# Patient Record
Sex: FEMALE | URBAN - NONMETROPOLITAN AREA
[De-identification: names, ages, dates, MRNs, and addresses within clinical notes are randomized per-mention and may not be internally consistent; named-entity substitution may affect disease eponyms.]

---

## 2018-08-30 ENCOUNTER — OFFICE VISIT (OUTPATIENT)
Dept: URBAN - NONMETROPOLITAN AREA CLINIC 9 | Facility: CLINIC | Age: 68
End: 2018-08-30
Payer: COMMERCIAL

## 2018-08-30 DIAGNOSIS — H26.493 OTHER SECONDARY CATARACT, BILATERAL: Primary | ICD-10-CM

## 2018-08-30 PROCEDURE — 92014 COMPRE OPH EXAM EST PT 1/>: CPT | Performed by: OPTOMETRIST

## 2018-08-30 RX ORDER — METOPROLOL TARTRATE 75 MG/1
75 MG TABLET, FILM COATED ORAL
Qty: 0 | Refills: 0 | Status: ACTIVE
Start: 2018-08-30

## 2018-08-30 RX ORDER — HYDROCHLOROTHIAZIDE 25 MG/1
25 MG TABLET ORAL
Qty: 0 | Refills: 0 | Status: INACTIVE
Start: 2018-08-30 | End: 2020-08-31

## 2018-08-30 ASSESSMENT — INTRAOCULAR PRESSURE
OD: 16
OS: 17

## 2018-08-30 NOTE — IMPRESSION/PLAN
DATE: 05/06/2017



The patient seen and examined.  Interim events noted.  Consults noted and appreciated.  Urology follo
wup and intervention noted and appreciated.  The patient remains in progressive care unit on telemetr
y monitoring.  Denies any specific complaint.  No chest pain, no shortness of breath.  No headache, n
o dizziness.



PHYSICAL EXAMINATION:

GENERAL:  The patient is in no acute distress.

VITAL SIGNS:  Stable.

HEART:  S1, S2 normal, regular.

LUNGS:  Good bilateral air entry.

ABDOMEN:  Soft, nontender.

EXTREMITIES:  No edema, no calf swelling, no tenderness, no acute ischemia.

CENTRAL NERVOUS SYSTEM:  Essentially unchanged.



DIAGNOSTIC DATA:  Available diagnostic data reviewed.



Overall, patient's general medical condition is ____.  Physical therapy evaluation is pending.  



PLAN:  As ordered.





__________________________________________

Alvino Hargrove MD







cc:



DD: 05/06/2017 07:07:34  [id number]

TT: 05/06/2017 07:55:23

Confirmation # [id number]

Dictation # [id number]

tn Impression: Other secondary cataract, bilateral: H26.493.  Plan: Observe

## 2018-08-30 NOTE — IMPRESSION/PLAN
Impression: Type 2 diabetes mellitus w/o complication: G06.8. Plan: Diabetes type II: no background retinopathy, no signs of neovascularization noted. Discussed ocular and systemic benefits of blood sugar control.

## 2019-09-06 ENCOUNTER — OFFICE VISIT (OUTPATIENT)
Dept: URBAN - NONMETROPOLITAN AREA CLINIC 9 | Facility: CLINIC | Age: 69
End: 2019-09-06
Payer: COMMERCIAL

## 2019-09-06 DIAGNOSIS — H04.123 DRY EYE SYNDROME OF BILATERAL LACRIMAL GLANDS: Chronic | ICD-10-CM

## 2019-09-06 PROCEDURE — 92014 COMPRE OPH EXAM EST PT 1/>: CPT | Performed by: OPTOMETRIST

## 2019-09-06 ASSESSMENT — VISUAL ACUITY: OS: 20/30

## 2019-09-06 ASSESSMENT — INTRAOCULAR PRESSURE
OS: 13
OD: 13

## 2020-08-31 ENCOUNTER — OFFICE VISIT (OUTPATIENT)
Dept: URBAN - NONMETROPOLITAN AREA CLINIC 9 | Facility: CLINIC | Age: 70
End: 2020-08-31
Payer: COMMERCIAL

## 2020-08-31 DIAGNOSIS — E11.9 TYPE 2 DIABETES MELLITUS W/O COMPLICATION: Primary | Chronic | ICD-10-CM

## 2020-08-31 DIAGNOSIS — H43.813 VITREOUS DEGENERATION, BILATERAL: Chronic | ICD-10-CM

## 2020-08-31 DIAGNOSIS — H26.492 OTHER SECONDARY CATARACT, LEFT EYE: Chronic | ICD-10-CM

## 2020-08-31 PROCEDURE — 92014 COMPRE OPH EXAM EST PT 1/>: CPT | Performed by: OPTOMETRIST

## 2020-08-31 ASSESSMENT — VISUAL ACUITY
OD: 20/25
OS: 20/150

## 2020-08-31 ASSESSMENT — INTRAOCULAR PRESSURE
OD: 16
OS: 16

## 2020-08-31 NOTE — IMPRESSION/PLAN
Impression: Type 2 diabetes mellitus w/o complication: H71.8. Bilateral. Plan: Diabetes type II: no background diabetic retinopathy, no signs of neovascularization noted. Discussed ocular and systemic benefits of blood sugar control. Continue to monitor for changes. Advised patient to RTC immediately if changes to vision are noted.

## 2020-08-31 NOTE — IMPRESSION/PLAN
Impression: Other secondary cataract, left eye: H26.492. Plan: Discussed diagnosis and treatment with patient. Patient referred to Dr. Yane Christensen for YAG consult.

## 2020-10-19 ENCOUNTER — OFFICE VISIT (OUTPATIENT)
Dept: URBAN - NONMETROPOLITAN AREA CLINIC 9 | Facility: CLINIC | Age: 70
End: 2020-10-19
Payer: COMMERCIAL

## 2020-10-19 DIAGNOSIS — Z96.1 PRESENCE OF INTRAOCULAR LENS: ICD-10-CM

## 2020-10-19 PROCEDURE — 99204 OFFICE O/P NEW MOD 45 MIN: CPT | Performed by: OPHTHALMOLOGY

## 2020-10-19 ASSESSMENT — KERATOMETRY
OD: 45.25
OS: 45.75

## 2020-10-19 ASSESSMENT — VISUAL ACUITY
OS: 20/150
OD: 20/25

## 2020-10-19 ASSESSMENT — INTRAOCULAR PRESSURE
OS: 15
OD: 15

## 2020-10-19 NOTE — IMPRESSION/PLAN
Impression: Type 2 diabetes mellitus w/o complication: O08.2. Plan: Diabetes type II: no background retinopathy, no signs of neovascularization noted. Discussed ocular and systemic benefits of blood sugar control.

## 2020-11-11 ENCOUNTER — SURGERY (OUTPATIENT)
Dept: URBAN - METROPOLITAN AREA SURGERY 40 | Facility: SURGERY | Age: 70
End: 2020-11-11
Payer: COMMERCIAL

## 2020-11-11 PROCEDURE — 66821 AFTER CATARACT LASER SURGERY: CPT | Performed by: OPHTHALMOLOGY

## 2020-11-16 ENCOUNTER — Encounter (OUTPATIENT)
Dept: URBAN - METROPOLITAN AREA SURGERY 39 | Facility: SURGERY | Age: 70
End: 2020-11-16
Payer: COMMERCIAL

## 2020-11-18 ENCOUNTER — POST-OPERATIVE VISIT (OUTPATIENT)
Dept: URBAN - NONMETROPOLITAN AREA CLINIC 9 | Facility: CLINIC | Age: 70
End: 2020-11-18
Payer: COMMERCIAL

## 2020-11-18 DIAGNOSIS — Z48.810 ENCOUNTER FOR SURGICAL AFTERCARE FOLLOWING SURGERY ON A SENSE ORGAN: Primary | ICD-10-CM

## 2020-11-18 PROCEDURE — 99024 POSTOP FOLLOW-UP VISIT: CPT | Performed by: OPTOMETRIST

## 2020-11-18 ASSESSMENT — INTRAOCULAR PRESSURE: OS: 16

## 2020-11-18 NOTE — IMPRESSION/PLAN
Impression: S/P YAG PC OS - 7 Days. Encounter for surgical aftercare following surgery on a sense organ  Z48.810. Plan: Stable. Patient not using drops.

## 2021-08-27 ENCOUNTER — OFFICE VISIT (OUTPATIENT)
Dept: URBAN - NONMETROPOLITAN AREA CLINIC 8 | Facility: CLINIC | Age: 71
End: 2021-08-27
Payer: COMMERCIAL

## 2021-08-27 PROCEDURE — 92014 COMPRE OPH EXAM EST PT 1/>: CPT | Performed by: OPTOMETRIST

## 2021-08-27 ASSESSMENT — INTRAOCULAR PRESSURE
OS: 17
OD: 17

## 2021-08-27 NOTE — IMPRESSION/PLAN
Impression: Type 2 diabetes mellitus w/o complication: S08.0. Plan: No diabetic retinopathy today. Discussed importance of closely monitoring and controlling glucose to minimize risks of progression of disease. Patient instructed to notify clinic immediately if changes to vision are noted.

## 2023-08-22 ENCOUNTER — OFFICE VISIT (OUTPATIENT)
Dept: URBAN - METROPOLITAN AREA CLINIC 63 | Facility: CLINIC | Age: 73
End: 2023-08-22
Payer: COMMERCIAL

## 2023-08-22 DIAGNOSIS — E11.9 TYPE 2 DIABETES MELLITUS W/O COMPLICATION: Primary | ICD-10-CM

## 2023-08-22 DIAGNOSIS — H02.055 TRICHIASIS WITHOUT ENTROPION LEFT LOWER EYELID: ICD-10-CM

## 2023-08-22 DIAGNOSIS — H04.123 DRY EYE SYNDROME OF BILATERAL LACRIMAL GLANDS: ICD-10-CM

## 2023-08-22 DIAGNOSIS — H43.813 VITREOUS DEGENERATION, BILATERAL: ICD-10-CM

## 2023-08-22 DIAGNOSIS — Z96.1 PRESENCE OF INTRAOCULAR LENS: ICD-10-CM

## 2023-08-22 PROCEDURE — 67820 REVISE EYELASHES: CPT | Performed by: OPTOMETRIST

## 2023-08-22 PROCEDURE — 92014 COMPRE OPH EXAM EST PT 1/>: CPT | Performed by: OPTOMETRIST

## 2023-08-22 ASSESSMENT — KERATOMETRY
OS: 45.00
OD: 44.75

## 2023-08-22 ASSESSMENT — INTRAOCULAR PRESSURE
OD: 14
OS: 14

## 2024-09-17 ENCOUNTER — OFFICE VISIT (OUTPATIENT)
Dept: URBAN - METROPOLITAN AREA CLINIC 63 | Facility: CLINIC | Age: 74
End: 2024-09-17
Payer: COMMERCIAL

## 2024-09-17 DIAGNOSIS — H43.813 VITREOUS DEGENERATION, BILATERAL: ICD-10-CM

## 2024-09-17 DIAGNOSIS — Z96.1 PRESENCE OF INTRAOCULAR LENS: ICD-10-CM

## 2024-09-17 DIAGNOSIS — H26.491 OTHER SECONDARY CATARACT, RIGHT EYE: ICD-10-CM

## 2024-09-17 DIAGNOSIS — E11.9 TYPE 2 DIABETES MELLITUS W/O COMPLICATION: Primary | ICD-10-CM

## 2024-09-17 DIAGNOSIS — H04.123 DRY EYE SYNDROME OF BILATERAL LACRIMAL GLANDS: ICD-10-CM

## 2024-09-17 PROCEDURE — 99214 OFFICE O/P EST MOD 30 MIN: CPT | Performed by: OPTOMETRIST

## 2024-09-17 ASSESSMENT — INTRAOCULAR PRESSURE
OS: 17
OD: 18

## 2024-09-17 ASSESSMENT — KERATOMETRY
OS: 45.13
OD: 44.75

## 2024-10-25 ENCOUNTER — OFFICE VISIT (OUTPATIENT)
Dept: URBAN - METROPOLITAN AREA CLINIC 63 | Facility: CLINIC | Age: 74
End: 2024-10-25
Payer: COMMERCIAL

## 2024-10-25 DIAGNOSIS — E11.9 TYPE 2 DIABETES MELLITUS W/O COMPLICATION: ICD-10-CM

## 2024-10-25 DIAGNOSIS — H26.491 OTHER SECONDARY CATARACT, RIGHT EYE: Primary | ICD-10-CM

## 2024-10-25 PROCEDURE — 99204 OFFICE O/P NEW MOD 45 MIN: CPT | Performed by: OPHTHALMOLOGY

## 2024-10-25 ASSESSMENT — INTRAOCULAR PRESSURE
OD: 18
OS: 18

## 2024-10-25 ASSESSMENT — KERATOMETRY
OS: 45.38
OD: 46.38

## 2024-10-25 ASSESSMENT — VISUAL ACUITY
OD: 20/20
OS: 20/25

## 2025-05-20 ENCOUNTER — SURGERY (OUTPATIENT)
Dept: URBAN - METROPOLITAN AREA SURGERY 36 | Facility: SURGERY | Age: 75
End: 2025-05-20
Payer: COMMERCIAL

## 2025-05-20 PROCEDURE — 66821 AFTER CATARACT LASER SURGERY: CPT | Performed by: OPHTHALMOLOGY

## 2025-06-03 ENCOUNTER — POST-OPERATIVE VISIT (OUTPATIENT)
Dept: URBAN - METROPOLITAN AREA CLINIC 60 | Facility: CLINIC | Age: 75
End: 2025-06-03

## 2025-06-03 DIAGNOSIS — H52.03 HYPERMETROPIA, BILATERAL: ICD-10-CM

## 2025-06-03 DIAGNOSIS — Z96.1 PRESENCE OF INTRAOCULAR LENS: Primary | ICD-10-CM

## 2025-06-03 PROCEDURE — 99024 POSTOP FOLLOW-UP VISIT: CPT | Performed by: OPTOMETRIST

## 2025-06-03 PROCEDURE — 92015 DETERMINE REFRACTIVE STATE: CPT | Performed by: OPTOMETRIST

## 2025-06-03 ASSESSMENT — VISUAL ACUITY
OD: 20/20
OS: 20/25

## 2025-06-03 ASSESSMENT — INTRAOCULAR PRESSURE
OS: 17
OD: 16